# Patient Record
Sex: MALE | Race: WHITE | Employment: FULL TIME | URBAN - METROPOLITAN AREA
[De-identification: names, ages, dates, MRNs, and addresses within clinical notes are randomized per-mention and may not be internally consistent; named-entity substitution may affect disease eponyms.]

---

## 2017-04-19 ENCOUNTER — OFFICE VISIT (OUTPATIENT)
Dept: FAMILY MEDICINE CLINIC | Facility: CLINIC | Age: 32
End: 2017-04-19

## 2017-04-19 VITALS
OXYGEN SATURATION: 98 % | HEART RATE: 111 BPM | TEMPERATURE: 99 F | RESPIRATION RATE: 25 BRPM | SYSTOLIC BLOOD PRESSURE: 130 MMHG | HEIGHT: 74 IN | WEIGHT: 159 LBS | DIASTOLIC BLOOD PRESSURE: 88 MMHG | BODY MASS INDEX: 20.41 KG/M2

## 2017-04-19 DIAGNOSIS — J01.00 ACUTE NON-RECURRENT MAXILLARY SINUSITIS: Primary | ICD-10-CM

## 2017-04-19 DIAGNOSIS — J30.1 SEASONAL ALLERGIC RHINITIS DUE TO POLLEN: ICD-10-CM

## 2017-04-19 PROCEDURE — 99213 OFFICE O/P EST LOW 20 MIN: CPT | Performed by: FAMILY MEDICINE

## 2017-04-19 RX ORDER — FLUTICASONE PROPIONATE 50 MCG
1 SPRAY, SUSPENSION (ML) NASAL 2 TIMES DAILY
Qty: 1 BOTTLE | Refills: 1 | Status: SHIPPED | OUTPATIENT
Start: 2017-04-19

## 2017-04-19 RX ORDER — AZITHROMYCIN 250 MG/1
TABLET, FILM COATED ORAL
Qty: 6 TABLET | Refills: 0 | Status: SHIPPED | OUTPATIENT
Start: 2017-04-19 | End: 2018-01-08

## 2017-04-19 NOTE — PROGRESS NOTES
Alvarado Zapata is a 32year old male.    Patient presents with:  URI: tightness, burning in the chest, x1week, started w/ sore throat, dry cough     HPI:    Symptoms started   7 days ago with purulent nasal discharge, maxillary sinus pressure, and headach KAVITA-SELAM) 250 MG Oral Tab 6 tablet 0      Sig: Take two tablets by mouth today, then one tablet daily.         (J30.1) Seasonal allergic rhinitis due to pollen  Plan:     Signed Prescriptions Disp Refills    Fluticasone Propionate 50 MCG/ACT Nasal Suspension

## 2017-04-19 NOTE — PATIENT INSTRUCTIONS
Acute Sinusitis    Acute sinusitis is irritation and swelling of the sinuses. It is usually caused by a viral infection after a common cold. Your doctor can help you find relief. What is acute sinusitis?   Sinuses are air-filled spaces in the skull behin © 5048-0388 [address], [address] [place]. All rights reserved. This information is not intended as a substitute for professional medical care. Always follow your healthcare professional's instructions.

## 2018-01-08 ENCOUNTER — OFFICE VISIT (OUTPATIENT)
Dept: FAMILY MEDICINE CLINIC | Facility: CLINIC | Age: 33
End: 2018-01-08

## 2018-01-08 VITALS
WEIGHT: 169 LBS | HEIGHT: 74 IN | SYSTOLIC BLOOD PRESSURE: 128 MMHG | HEART RATE: 90 BPM | BODY MASS INDEX: 21.69 KG/M2 | DIASTOLIC BLOOD PRESSURE: 76 MMHG | TEMPERATURE: 98 F | RESPIRATION RATE: 16 BRPM

## 2018-01-08 DIAGNOSIS — J01.00 ACUTE NON-RECURRENT MAXILLARY SINUSITIS: ICD-10-CM

## 2018-01-08 DIAGNOSIS — R68.89 FLU-LIKE SYMPTOMS: Primary | ICD-10-CM

## 2018-01-08 DIAGNOSIS — J30.1 SEASONAL ALLERGIC RHINITIS DUE TO POLLEN: ICD-10-CM

## 2018-01-08 PROCEDURE — 99213 OFFICE O/P EST LOW 20 MIN: CPT | Performed by: FAMILY MEDICINE

## 2018-01-08 RX ORDER — AZITHROMYCIN 250 MG/1
TABLET, FILM COATED ORAL
Qty: 6 TABLET | Refills: 0 | Status: SHIPPED | OUTPATIENT
Start: 2018-01-08 | End: 2020-01-20 | Stop reason: ALTCHOICE

## 2018-01-08 NOTE — PROGRESS NOTES
HPI:   Primo Gilbert is a 28year old male who presents for upper respiratory symptoms for  3  days.  Patient reports sore throat, congestion, clear and yellow colored nasal discharge, dry cough, chest pain from coughing, OTC cold meds have not been helpin without murmur  GI: good BS's,no masses, HSM or tenderness    ASSESSMENT AND PLAN:   Boom Boykin is a 28year old male who presents with  1. Flu-like symptoms  -oral abx for bacterial bronchitis, recovering from flu  - azithromycin (Yana Barreto) 250

## 2018-12-02 ENCOUNTER — WALK IN (OUTPATIENT)
Dept: URGENT CARE | Age: 33
End: 2018-12-02

## 2018-12-02 VITALS
SYSTOLIC BLOOD PRESSURE: 122 MMHG | BODY MASS INDEX: 22.4 KG/M2 | WEIGHT: 165.34 LBS | HEIGHT: 72 IN | DIASTOLIC BLOOD PRESSURE: 84 MMHG | HEART RATE: 88 BPM | RESPIRATION RATE: 18 BRPM | TEMPERATURE: 98.4 F

## 2018-12-02 DIAGNOSIS — J01.10 ACUTE NON-RECURRENT FRONTAL SINUSITIS: Primary | ICD-10-CM

## 2018-12-02 PROCEDURE — 99203 OFFICE O/P NEW LOW 30 MIN: CPT | Performed by: NURSE PRACTITIONER

## 2018-12-02 RX ORDER — DOXYCYCLINE 100 MG/1
100 CAPSULE ORAL 2 TIMES DAILY
Qty: 20 CAPSULE | Refills: 0 | Status: SHIPPED | OUTPATIENT
Start: 2018-12-02 | End: 2018-12-09

## 2018-12-02 ASSESSMENT — ENCOUNTER SYMPTOMS
FEVER: 1
COUGH: 1
FACIAL SWELLING: 1
FATIGUE: 1
SORE THROAT: 1
SHORTNESS OF BREATH: 1
SINUS PRESSURE: 1

## 2019-05-19 ENCOUNTER — OFFICE VISIT (OUTPATIENT)
Dept: FAMILY MEDICINE CLINIC | Facility: CLINIC | Age: 34
End: 2019-05-19
Payer: COMMERCIAL

## 2019-05-19 VITALS
DIASTOLIC BLOOD PRESSURE: 82 MMHG | WEIGHT: 171 LBS | BODY MASS INDEX: 21.94 KG/M2 | HEART RATE: 87 BPM | SYSTOLIC BLOOD PRESSURE: 124 MMHG | HEIGHT: 74 IN | RESPIRATION RATE: 16 BRPM | TEMPERATURE: 98 F | OXYGEN SATURATION: 98 %

## 2019-05-19 DIAGNOSIS — H61.891 IRRITATION OF EXTERNAL AUDITORY CANAL, RIGHT: ICD-10-CM

## 2019-05-19 DIAGNOSIS — H61.21 IMPACTED CERUMEN OF RIGHT EAR: Primary | ICD-10-CM

## 2019-05-19 DIAGNOSIS — H60.501 ACUTE OTITIS EXTERNA OF RIGHT EAR, UNSPECIFIED TYPE: ICD-10-CM

## 2019-05-19 PROCEDURE — 99213 OFFICE O/P EST LOW 20 MIN: CPT | Performed by: PHYSICIAN ASSISTANT

## 2019-05-19 RX ORDER — OFLOXACIN 3 MG/ML
10 SOLUTION AURICULAR (OTIC) DAILY
Qty: 10 ML | Refills: 0 | Status: SHIPPED | OUTPATIENT
Start: 2019-05-19 | End: 2019-05-26

## 2019-05-19 NOTE — PROGRESS NOTES
CHIEF COMPLAINT:   Patient presents with:  Ear Problem: x 2 days, difficulty hearing, pressure, ringing in Right ear      HPI:   Kai Minaya is a 35year old male who presents for clogged right ear for  2 days.   Patient reports decreased hearing to righ EARS: Ears visualized after right cerumen removal.  Left TM not erythematous, no bulging, no retraction,  No fluid, bony landmarks intact.  Left EAC WNL.  Right TM minimally erythematous, no bulging, no retraction, no fluid.  Right EAC erythematous.      N Risks, benefits, and side effects of medication explained and discussed. Patient Instructions     -DEBROX DROPS AS NEEDED FOR WAX      Impacted Earwax     Inner ear structures including ear canal and eardrum.      Impacted earwax is a buildup of the natu In some cases, the cause of impacted earwax is not known. Symptoms of impacted earwax  Excess earwax usually does not cause any symptoms, unless there is a large amount of buildup.  Then it may cause symptoms such as:  · Hearing loss  · Earache  · Sense of © 9753-8589 [address] [facility], [address] [place]. All rights reserved. This information is not intended as a substitute for professional medical care. Always follow your healthcare professional's instructions.             The

## 2019-05-19 NOTE — PATIENT INSTRUCTIONS
-DEBROX DROPS AS NEEDED FOR WAX      Impacted Earwax     Inner ear structures including ear canal and eardrum. Impacted earwax is a buildup of the natural wax in the ear (cerumen). Impacted earwax is very common.  It can cause symptoms such as hearing l there is a large amount of buildup.  Then it may cause symptoms such as:  · Hearing loss  · Earache  · Sense of ear fullness  · Itching in the ear  · Odor from the ear  · Ear drainage  · Dizziness  · Ringing in the ears  · Cough  Treatment for impacted earw professional medical care. Always follow your healthcare professional's instructions.

## 2020-01-20 ENCOUNTER — OFFICE VISIT (OUTPATIENT)
Dept: FAMILY MEDICINE CLINIC | Facility: CLINIC | Age: 35
End: 2020-01-20
Payer: COMMERCIAL

## 2020-01-20 VITALS
OXYGEN SATURATION: 97 % | HEART RATE: 88 BPM | RESPIRATION RATE: 16 BRPM | DIASTOLIC BLOOD PRESSURE: 84 MMHG | WEIGHT: 172.19 LBS | TEMPERATURE: 98 F | HEIGHT: 74 IN | BODY MASS INDEX: 22.1 KG/M2 | SYSTOLIC BLOOD PRESSURE: 122 MMHG

## 2020-01-20 DIAGNOSIS — J06.9 VIRAL URI WITH COUGH: Primary | ICD-10-CM

## 2020-01-20 PROCEDURE — 99213 OFFICE O/P EST LOW 20 MIN: CPT | Performed by: NURSE PRACTITIONER

## 2020-01-20 RX ORDER — AZITHROMYCIN 250 MG/1
TABLET, FILM COATED ORAL
Qty: 6 TABLET | Refills: 0 | Status: SHIPPED | OUTPATIENT
Start: 2020-01-20 | End: 2021-05-22

## 2020-01-20 RX ORDER — FLUTICASONE PROPIONATE 50 MCG
2 SPRAY, SUSPENSION (ML) NASAL DAILY
Qty: 1 BOTTLE | Refills: 0 | Status: SHIPPED | OUTPATIENT
Start: 2020-01-20 | End: 2021-01-14

## 2020-01-20 NOTE — PROGRESS NOTES
CHIEF COMPLAINT:   Patient presents with:  Nasal Congestion: cough, stuffy head x 3 days       HPI:   Blair Coleman is a 29year old male who presents for upper respiratory symptoms intermittently for about 1 month.  This occurrence feels to be new onset GI: denies N/V/C or abdominal pain  NEURO: Denies headaches    EXAM:   /84 (BP Location: Right arm, Patient Position: Sitting, Cuff Size: adult)   Pulse 88   Temp 98.4 °F (36.9 °C) (Oral)   Resp 16   Ht 74\"   Wt 172 lb 3.2 oz (78.1 kg)   SpO2 97% The sinuses are air-filled spaces within the bones of the face. They connect to the inside of the nose. Sinusitis is an inflammation of the tissue that lines the sinuses.  Sinusitis can occur during a cold. It can also happen due to allergies to pollens and · Use acetaminophen or ibuprofen to control pain, unless another pain medicine was prescribed to you. If you have chronic liver or kidney disease or ever had a stomach ulcer, talk with your healthcare provider before using these medicines.  (Aspirin should

## 2021-05-22 ENCOUNTER — OFFICE VISIT (OUTPATIENT)
Dept: FAMILY MEDICINE CLINIC | Facility: CLINIC | Age: 36
End: 2021-05-22
Payer: COMMERCIAL

## 2021-05-22 VITALS
HEIGHT: 74 IN | BODY MASS INDEX: 22.46 KG/M2 | WEIGHT: 175 LBS | OXYGEN SATURATION: 98 % | RESPIRATION RATE: 18 BRPM | DIASTOLIC BLOOD PRESSURE: 86 MMHG | HEART RATE: 87 BPM | SYSTOLIC BLOOD PRESSURE: 127 MMHG | TEMPERATURE: 97 F

## 2021-05-22 DIAGNOSIS — H61.21 IMPACTED CERUMEN OF RIGHT EAR: ICD-10-CM

## 2021-05-22 DIAGNOSIS — H60.501 ACUTE OTITIS EXTERNA OF RIGHT EAR, UNSPECIFIED TYPE: Primary | ICD-10-CM

## 2021-05-22 PROCEDURE — 3008F BODY MASS INDEX DOCD: CPT | Performed by: NURSE PRACTITIONER

## 2021-05-22 PROCEDURE — 99213 OFFICE O/P EST LOW 20 MIN: CPT | Performed by: NURSE PRACTITIONER

## 2021-05-22 PROCEDURE — 3074F SYST BP LT 130 MM HG: CPT | Performed by: NURSE PRACTITIONER

## 2021-05-22 PROCEDURE — 3079F DIAST BP 80-89 MM HG: CPT | Performed by: NURSE PRACTITIONER

## 2021-05-22 NOTE — PATIENT INSTRUCTIONS
External Ear Infection (Adult)    External otitis (also called “swimmer’s ear”) is an infection in the ear canal. It's often caused by bacteria or fungus. It can occur a few days after water gets trapped in the ear canal (from swimming or bathing).  It ca to seek medical advice  Call your healthcare provider right away if any of these occur:   · Ear pain becomes worse or doesn’t improve after 3 days of treatment  · Redness or swelling of the outer ear occurs or gets worse  · Headache  · Fever of 100.4ºF (38 such as eczema  · Autoimmune diseases, such as lupus  · A narrowed ear canal from birth, chronic inflammation, or injury  · Too much earwax because of injury  · Too much earwax because of  water in the ear canal  Putting objects in the ear again and again cases, you may be able to prevent earwax buildup by:   · Using ear drops once a week  · Having a regular ear cleaning about every 6 months  · Not using cotton swabs in the ear  When to call the healthcare provider  Call your healthcare provider right away

## 2021-05-22 NOTE — PROGRESS NOTES
CHIEF COMPLAINT:   Patient presents with:  Ear Problem      HPI:   Jamia Everett is a 28year old male who presents to clinic today with complaints of right ear clogged. Has had for 5  days. Patient denies history of ear infections.   Associated sympto with white discharge  Right TM: serous, + bulging, no retraction, no effusion, bony landmarks intact. Left TM: serous, + bulging, no retraction,no effusion, bony landmarks intact.   NOSE: nostrils patent, no nasal discharge, nasal mucosa pink and noninfla 0     Sig: Place 4 drops into the right ear 4 (four) times daily for 7 days.            Patient Instructions       External Ear Infection (Adult)    External otitis (also called “swimmer’s ear”) is an infection in the ear canal. It's often caused by bacteri from the ear canal.    Follow-up care  Follow up with your healthcare provider in 1 week, or as advised.    When to seek medical advice  Call your healthcare provider right away if any of these occur:   · Ear pain becomes worse or doesn’t improve after 3 da the ear (osteoma or exostoses)  · Infections, such as an outer ear infection (external otitis)  · Skin disease, such as eczema  · Autoimmune diseases, such as lupus  · A narrowed ear canal from birth, chronic inflammation, or injury  · Too much earwax meggan be able to prevent impacted earwax if you have a health condition that causes it, such as eczema.  In other cases, you may be able to prevent earwax buildup by:   · Using ear drops once a week  · Having a regular ear cleaning about every 6 months  · Not usi

## 2022-03-23 ENCOUNTER — OFFICE VISIT (OUTPATIENT)
Dept: FAMILY MEDICINE CLINIC | Facility: CLINIC | Age: 37
End: 2022-03-23
Payer: COMMERCIAL

## 2022-03-23 VITALS
DIASTOLIC BLOOD PRESSURE: 80 MMHG | WEIGHT: 175 LBS | SYSTOLIC BLOOD PRESSURE: 130 MMHG | OXYGEN SATURATION: 98 % | TEMPERATURE: 100 F | BODY MASS INDEX: 23.7 KG/M2 | HEART RATE: 98 BPM | HEIGHT: 72 IN | RESPIRATION RATE: 20 BRPM

## 2022-03-23 DIAGNOSIS — J02.9 SORE THROAT: Primary | ICD-10-CM

## 2022-03-23 LAB
CONTROL LINE PRESENT WITH A CLEAR BACKGROUND (YES/NO): YES YES/NO
OPERATOR ID: NORMAL
POCT LOT NUMBER: NORMAL
RAPID SARS-COV-2 BY PCR: NOT DETECTED
STREP GRP A CUL-SCR: NEGATIVE

## 2022-03-23 PROCEDURE — 99213 OFFICE O/P EST LOW 20 MIN: CPT | Performed by: NURSE PRACTITIONER

## 2022-03-23 PROCEDURE — 3079F DIAST BP 80-89 MM HG: CPT | Performed by: NURSE PRACTITIONER

## 2022-03-23 PROCEDURE — 3008F BODY MASS INDEX DOCD: CPT | Performed by: NURSE PRACTITIONER

## 2022-03-23 PROCEDURE — 3075F SYST BP GE 130 - 139MM HG: CPT | Performed by: NURSE PRACTITIONER

## 2022-03-23 PROCEDURE — 87880 STREP A ASSAY W/OPTIC: CPT | Performed by: NURSE PRACTITIONER

## 2022-03-23 PROCEDURE — U0002 COVID-19 LAB TEST NON-CDC: HCPCS | Performed by: NURSE PRACTITIONER

## 2024-11-11 ENCOUNTER — OFFICE VISIT (OUTPATIENT)
Dept: FAMILY MEDICINE CLINIC | Facility: CLINIC | Age: 39
End: 2024-11-11
Payer: COMMERCIAL

## 2024-11-11 VITALS
OXYGEN SATURATION: 100 % | BODY MASS INDEX: 24 KG/M2 | WEIGHT: 178 LBS | DIASTOLIC BLOOD PRESSURE: 80 MMHG | HEART RATE: 74 BPM | TEMPERATURE: 99 F | SYSTOLIC BLOOD PRESSURE: 122 MMHG

## 2024-11-11 DIAGNOSIS — H61.23 BILATERAL IMPACTED CERUMEN: Primary | ICD-10-CM

## 2024-11-11 PROCEDURE — 3074F SYST BP LT 130 MM HG: CPT | Performed by: NURSE PRACTITIONER

## 2024-11-11 PROCEDURE — 69209 REMOVE IMPACTED EAR WAX UNI: CPT | Performed by: NURSE PRACTITIONER

## 2024-11-11 PROCEDURE — 3079F DIAST BP 80-89 MM HG: CPT | Performed by: NURSE PRACTITIONER

## 2024-11-11 NOTE — PROGRESS NOTES
CHIEF COMPLAINT:     Chief Complaint   Patient presents with    Ear Pain     Right ear pain with clogged for 3 days.  No OTC meds taken.         HPI:   Yousuf Griffin is a 39 year old male who presents to clinic today with complaints of bilat ear clogged R>L. Has had for 3  days.   Patient reports history of cerumen impaction. Home treatment includes none.     Associated symptoms:  Patient denies decreased hearing. Patient denies hearing loss. Patient denies drainage. Patient reports use of cotton tipped ear swabs to clean the ears. Patient reports following URI symptoms: none    Current Outpatient Medications   Medication Sig Dispense Refill    Fluticasone Propionate 50 MCG/ACT Nasal Suspension 1 spray by Nasal route 2 (two) times daily. (Patient not taking: No sig reported) 1 Bottle 1    Cetirizine HCl 10 MG Oral Cap Take 1 tablet by mouth daily.        Past Medical History:    Febrile seizure (HCC)    as a child    Seizure (HCC)      Social History:  Social History     Socioeconomic History    Marital status:    Occupational History     Employer: [employer]   Tobacco Use    Smoking status: Never    Smokeless tobacco: Never   Vaping Use    Vaping status: Never Used   Substance and Sexual Activity    Alcohol use: No    Drug use: No   Other Topics Concern    Caffeine Concern Yes     Comment: 2 cups daily    Sleep Concern No    Exercise Yes     Comment: walking        REVIEW OF SYSTEMS:   GENERAL: See HPI  SKIN: no unusual skin lesions or rashes  HEENT: See HPI  LUNGS: No shortness of breath, or wheezing.  CARDIOVASCULAR: No chest pain, palpitations  GI: No N/V/C/D.  NEURO: denies headaches, + mild dizziness for 2 days    EXAM:   /80   Pulse 74   Temp 98.6 °F (37 °C) (Oral)   Wt 178 lb (80.7 kg)   SpO2 100%   BMI 24.14 kg/m²   GENERAL: well developed, well nourished,in no apparent distress  HEAD: atraumatic, normocephalic  EYES: conjunctiva clear,   EARS: bilat tragus non tender with manipulation.   External auditory canal right mild erythema after cerumen removal, left with minimal cerumen. Right TM: grey, no bulging, no retraction, no effusion, bony landmarks visible.  Left TM: grey, no bulging, no retraction,no effusion, bony landmarks visible.  NOSE: nostrils patent, no nasal mucus,   LUNGS: clear to auscultation bilaterally, no wheezes or rhonchi. Breathing is non labored.  CARDIO: RRR without murmur  PSYCH: pleasant mood and affect  NEURO: no focal deficits      ASSESSMENT AND PLAN:   Yousuf Griffin is a 39 year old male who presents with ear problems symptoms are consistent with    ASSESSMENT:  Encounter Diagnosis   Name Primary?    Bilateral impacted cerumen Yes       PLAN:   After pt gave verbal consent, bilat ears irrigated with warm water/H2O2 without incident with successful removal of cerumen  Comfort measures as described in Patient Instructions     Follow up with PCP PRN  Patient voiced understand and is in agreement with treatment plan.    There are no Patient Instructions on file for this visit.

## 2024-11-14 ENCOUNTER — TELEPHONE (OUTPATIENT)
Dept: FAMILY MEDICINE CLINIC | Facility: CLINIC | Age: 39
End: 2024-11-14

## 2024-11-14 ENCOUNTER — PATIENT MESSAGE (OUTPATIENT)
Dept: FAMILY MEDICINE CLINIC | Facility: CLINIC | Age: 39
End: 2024-11-14

## 2024-11-15 ENCOUNTER — TELEPHONE (OUTPATIENT)
Dept: FAMILY MEDICINE CLINIC | Facility: CLINIC | Age: 39
End: 2024-11-15

## 2024-11-16 ENCOUNTER — OFFICE VISIT (OUTPATIENT)
Dept: FAMILY MEDICINE CLINIC | Facility: CLINIC | Age: 39
End: 2024-11-16
Payer: COMMERCIAL

## 2024-11-16 VITALS
HEART RATE: 96 BPM | SYSTOLIC BLOOD PRESSURE: 128 MMHG | DIASTOLIC BLOOD PRESSURE: 88 MMHG | OXYGEN SATURATION: 98 % | TEMPERATURE: 98 F | RESPIRATION RATE: 16 BRPM

## 2024-11-16 DIAGNOSIS — H61.22 IMPACTED CERUMEN OF LEFT EAR: Primary | ICD-10-CM

## 2024-11-16 PROCEDURE — 3079F DIAST BP 80-89 MM HG: CPT | Performed by: NURSE PRACTITIONER

## 2024-11-16 PROCEDURE — 69209 REMOVE IMPACTED EAR WAX UNI: CPT | Performed by: NURSE PRACTITIONER

## 2024-11-16 PROCEDURE — 3074F SYST BP LT 130 MM HG: CPT | Performed by: NURSE PRACTITIONER

## 2024-11-16 NOTE — PROGRESS NOTES
Patient presents with:    Request for Ear Wax Removal    HPI: Patient presents for removal of ear wax in left. Has ongoing issue with cerumen build up. Home treatments tried: debrox ear drops. Reports diminished hearing, itching.  Denies ear pain, fever, chills.    Cerumen Removal Procedure  Patient gave verbal consent.   Risks and Benefits of removal were discussed with the patient. Pt agreed to proceed with procedure.    Location: Left ear  Indication: TM not visible, local itching, fullness.  Prep: Hydrogen Peroxide with warm water via syringe  Removal: Otoscope visualization of cerumen and ear lavage were used to remove the wax  Patient Status: Tolerated well; No complications      EXAM: Prior to cerumen removal:  left EAC with cerumen impaction; left TM not visible.  No tragal tenderness on palpation.  After removal: EACs healthy and without lesions. TMs healthy, no injection, fluid, retraction.     Assessment:  Cerumen impaction of left ear    Plan: Successful removal with ear lavage. Patient tolerated without complications.    Discussed prevention of impaction.     F/U as needed.

## 2024-11-26 ENCOUNTER — OFFICE VISIT (OUTPATIENT)
Facility: LOCATION | Age: 39
End: 2024-11-26

## 2024-11-26 DIAGNOSIS — H93.8X9 SENSATION OF FULLNESS IN EAR, UNSPECIFIED LATERALITY: Primary | ICD-10-CM

## 2024-11-26 DIAGNOSIS — H93.8X2 SENSATION OF FULLNESS IN LEFT EAR: Primary | ICD-10-CM

## 2024-11-26 DIAGNOSIS — H92.02 LEFT EAR PAIN: ICD-10-CM

## 2024-11-26 PROCEDURE — 92557 COMPREHENSIVE HEARING TEST: CPT | Performed by: AUDIOLOGIST

## 2024-11-26 PROCEDURE — 92567 TYMPANOMETRY: CPT | Performed by: AUDIOLOGIST

## 2024-11-26 PROCEDURE — 99203 OFFICE O/P NEW LOW 30 MIN: CPT | Performed by: STUDENT IN AN ORGANIZED HEALTH CARE EDUCATION/TRAINING PROGRAM

## 2024-11-26 PROCEDURE — 92504 EAR MICROSCOPY EXAMINATION: CPT | Performed by: STUDENT IN AN ORGANIZED HEALTH CARE EDUCATION/TRAINING PROGRAM

## 2024-11-26 NOTE — PROGRESS NOTES
[facility]  OTOLARYNGOLOGY - HEAD & NECK SURGERY    11/26/2024     Reason for Consultation:   Left ear fullness, left ear pain    History of Present Illness:   Patient is a pleasant 39 year old male who is being seen for left-sided ear fullness and pain which began earlier in the month.  The patient states that he initially came in to see the urgent care for his right ear fullness.  At that time he had his right ear irrigated and debris was cleared.  He felt instant relief on the right side.  At the same time they noted fullness in the left ear canal and irrigated his left ear without any success.  They told the patient that the rest of it might dissolve however he continues to be symptomatic and return to see them.  They then irrigated again and they were able to clear the ear however he continues to feel fullness in the left ear.  He has not had any recurring ear infections in the past.  No history of any ear surgery.  No vertigo aside from when he had his ears irrigated.    Past Medical History  Past Medical History:    Febrile seizure (HCC)    as a child    Seizure (HCC)       Past Surgical History  Past Surgical History:   Procedure Laterality Date    Adenoidectomy      Removal adenoids,primary,<13 y/o      Tonsillectomy         Family History  Family History   Problem Relation Age of Onset    Dementia Other     Lipids Other        Social History  Pediatric History   Patient Parents    Placido Griffin (Father)     Other Topics Concern     Service Not Asked    Blood Transfusions Not Asked    Caffeine Concern Yes     Comment: 2 cups daily    Occupational Exposure Not Asked    Hobby Hazards Not Asked    Sleep Concern No    Stress Concern Not Asked    Weight Concern Not Asked    Special Diet Not Asked    Back Care Not Asked    Exercise Yes     Comment: walking    Bike Helmet Not Asked    Seat Belt Not Asked    Self-Exams Not Asked   Social History Narrative    Not on file           Current Medications:  Current  Outpatient Medications   Medication Sig Dispense Refill    Fluticasone Propionate 50 MCG/ACT Nasal Suspension 1 spray by Nasal route 2 (two) times daily. (Patient not taking: No sig reported) 1 Bottle 1    Cetirizine HCl 10 MG Oral Cap Take 1 tablet by mouth daily.         Allergies  Allergies[1]    Review of Systems:   A comprehensive 10 point review of systems was completed.  Pertinent positives and negatives noted in the the HPI.    Physical Exam:   There were no vitals taken for this visit.    GENERAL: No acute distress, Comfortable appearing  FACE: HB 1/6, Normal Animation  HEAD: Normocephalic  EYES: EOMI, pupils equil  EARS: Bilateral Auricles Symmetric  NOSE: Nares patent bilaterally  ORAL CAVITY: Tongue mobile, Oropharynx clear, Floor of mouth clear, Posterior oropharynx normal  NECK: No palpable lymphadenopathy, thyroid not palpable, nontender    Canals:  Right: Clear  Left: Clear    Tympanic Membranes:  Right: Normal tympanic membrane, with no retraction, middle ear space clear  Left: Normal tympanic membrane. with no retraction middle ear space clear    TM Visualized Method:   Right TM examined via otomicroscopy.   Left TM examined via otomicroscopy.      Results:     Laboratory Data:  Lab Results   Component Value Date    WBC 8.1 05/28/2016    HGB 16.6 05/28/2016    HCT 49.6 05/28/2016    .0 05/28/2016    CREATSERUM 0.94 05/28/2016    BUN 21 (H) 05/28/2016     05/28/2016    K 4.0 05/28/2016     05/28/2016    CO2 28.0 05/28/2016    GLU 95 05/28/2016    CA 9.2 05/28/2016    ALB 4.4 05/28/2016    ALKPHO 68 05/28/2016    TP 8.0 05/28/2016    AST 30 05/28/2016    ALT 28 05/28/2016    TSH 1.690 05/28/2016    POCGLU 155 (H) 05/27/2013         Imaging:  No results found.    Latest Audiogram Result (Hz) Exam performed: 11/26/2024 3:30 PM Last edited by Ani Castañeda Au.D on 11/26/2024 3:35 PM        125 250  1500 2000 3000 4000 6000 8000    Right air:  15 15  10  5  15  10    Left  air:  10 10  10  5  20  10    Left mastoid bone:         15         Reliability:  Fair    Transducer:  Inserts    Technique:  Conventional Audiometry    Comments:            Latest Speech Audiometry  Last edited by Ani Castañeda Au.D on 11/26/2024 3:34 PM       Ear Method PTA SAT SRT [facility] Test/list Score (%) Intensity Mask/noise Notes    right live voice   15   10 By Difficulty 100 55      left live voice   15   10 By Difficulty 96 55                    Latest Tympanogram Result       Probe Tone (Hz): 226 Exam performed: 11/26/2024 3:30 PM Last edited by Ani Castañeda Au.D on 11/26/2024 3:35 PM      Tympanograms  These were drawn by a user, not generated from device data      Right Ear Left Ear                     Right Ear Left Ear    Tympanogram type: Type A Type A    Canal volume (mL): 2.7 1.9    Peak pressure (daPa): 0 6    Peak amplitude (mL): 0.88 0.76    Tympanogram width (daPa):        Comments:                    Latest Audiogram and Tympanogram Result Text  Last edited by Ani Castañeda Au.D on 11/26/2024  3:34 PM      Study Result                 Narrative & Impression    Otoscopic inspection: right ear no cerumen; left ear no cerumen.       Tests/Procedures  Patient was tested via  standard insert earphones and a bone oscillator standard audiometry     Audiometric Results (Pure Tone Results)    Audiometric thresholds indicate hearing is within normal limits in both ears     Immitance Test Results    A tympanogram was performed  Tympanometry suggests normal findings bilaterally.    Follow up with Sherif Hankins D.O..  Audiological monitoring as needed during the course of medical management.                       Impression:       ICD-10-CM    1. Sensation of fullness in left ear  H93.8X2       2. Left ear pain  H92.02            Recommendations:  The patient's audiogram today is completely normal with type a tympanograms bilaterally.  The patient states that his symptoms in the left ear do  fluctuate.  He does sometimes have some popping and crackling in the ear.  I do think that there may be a component of eustachian tube dysfunction.  I would like him to use Flonase on a more regular basis with 2 sprays in each nostril twice daily.  He will continue this and he will return to see me if he has any persistence in his symptoms or any worsening.    Thank you for allowing me to participate in the care of your patient.    Sherif Hankins,    Otolaryngology/Rhinology, Sinus, and Endoscopic Skull Base Surgery  [address] [place] [address]  Phone [phone]  Fax [phone]  11/26/2024  3:26 PM  11/26/2024          [1]   Allergies  Allergen Reactions    Penicillins HIVES

## 2025-02-27 ENCOUNTER — OFFICE VISIT (OUTPATIENT)
Dept: FAMILY MEDICINE CLINIC | Facility: CLINIC | Age: 40
End: 2025-02-27
Payer: COMMERCIAL

## 2025-02-27 VITALS
HEART RATE: 117 BPM | RESPIRATION RATE: 16 BRPM | HEIGHT: 72 IN | OXYGEN SATURATION: 97 % | SYSTOLIC BLOOD PRESSURE: 122 MMHG | TEMPERATURE: 101 F | BODY MASS INDEX: 24.52 KG/M2 | DIASTOLIC BLOOD PRESSURE: 74 MMHG | WEIGHT: 181 LBS

## 2025-02-27 DIAGNOSIS — R68.89 FLU-LIKE SYMPTOMS: Primary | ICD-10-CM

## 2025-02-27 PROCEDURE — 99213 OFFICE O/P EST LOW 20 MIN: CPT | Performed by: NURSE PRACTITIONER

## 2025-02-27 PROCEDURE — 3008F BODY MASS INDEX DOCD: CPT | Performed by: NURSE PRACTITIONER

## 2025-02-27 PROCEDURE — 3078F DIAST BP <80 MM HG: CPT | Performed by: NURSE PRACTITIONER

## 2025-02-27 PROCEDURE — 87637 SARSCOV2&INF A&B&RSV AMP PRB: CPT | Performed by: NURSE PRACTITIONER

## 2025-02-27 PROCEDURE — 3074F SYST BP LT 130 MM HG: CPT | Performed by: NURSE PRACTITIONER

## 2025-02-27 RX ORDER — OSELTAMIVIR PHOSPHATE 75 MG/1
75 CAPSULE ORAL 2 TIMES DAILY
Qty: 10 CAPSULE | Refills: 0 | Status: SHIPPED | OUTPATIENT
Start: 2025-02-27 | End: 2025-03-04

## 2025-02-28 LAB
FLUAV + FLUBV RNA SPEC NAA+PROBE: DETECTED
FLUAV + FLUBV RNA SPEC NAA+PROBE: NOT DETECTED
RSV RNA SPEC NAA+PROBE: NOT DETECTED
SARS-COV-2 RNA RESP QL NAA+PROBE: NOT DETECTED

## 2025-02-28 NOTE — PROGRESS NOTES
CHIEF COMPLAINT:     Chief Complaint   Patient presents with    Cough     Sx onset last night w chills, cough, congestion, HA, body aches  Denies ST     OTC Tylenol        HPI:   Yousuf Griffin is a 39 year old male who presents for ill symptoms for 1 days. Patient reports chills, cough, congestion, headache, body aches, fatigue. Denies ear pain. Symptoms have been worsening since onset.  Treating symptoms with tylenol.      Current Outpatient Medications   Medication Sig Dispense Refill    oseltamivir 75 MG Oral Cap Take 1 capsule (75 mg total) by mouth 2 (two) times daily for 5 days. 10 capsule 0    Fluticasone Propionate 50 MCG/ACT Nasal Suspension 1 spray by Nasal route 2 (two) times daily. (Patient not taking: No sig reported) 1 Bottle 1    Cetirizine HCl 10 MG Oral Cap Take 1 tablet by mouth daily.        Past Medical History:    Febrile seizure (HCC)    as a child    Seizure (HCC)      Past Surgical History:   Procedure Laterality Date    Adenoidectomy      Removal adenoids,primary,<13 y/o      Tonsillectomy           Social History     Socioeconomic History    Marital status:    Occupational History     Employer: [employer]   Tobacco Use    Smoking status: Never    Smokeless tobacco: Never   Vaping Use    Vaping status: Never Used   Substance and Sexual Activity    Alcohol use: No    Drug use: No   Other Topics Concern    Caffeine Concern Yes     Comment: 2 cups daily    Sleep Concern No    Exercise Yes     Comment: walking         REVIEW OF SYSTEMS:   GENERAL: feels well otherwise, good appetite  SKIN: no rashes or abnormal skin lesions  HEENT: See HPI  LUNGS: denies shortness of breath or wheezing, See HPI  CARDIOVASCULAR: denies chest pain or palpitations   GI: denies N/V/C or abdominal pain  NEURO: Denies headaches    EXAM:   /74   Pulse 117   Temp (!) 101.1 °F (38.4 °C) (Oral)   Resp 16   Ht 6' (1.829 m)   Wt 181 lb (82.1 kg)   SpO2 97%   BMI 24.55 kg/m²   GENERAL: well developed, well  nourished, in no apparent distress  SKIN: no rashes, no suspicious lesions  HEENT: atraumatic, normocephalic. conjunctiva clear. TM's gray, no bulging, no retraction, + fluid, bony landmarks intact. clear nasal discharge, nasal mucosa erythematous and swollen. Oral mucosa pink, moist. Posterior pharynx is not erythematous. no exudates. no Sinus tenderness with palpation.   THROAT:NECK: Supple, non-tender  LUNGS: clear to auscultation   CARDIO: RRR without murmur  EXTREMITIES: no cyanosis, clubbing or edema  LYMP: bilateral anterior cervical lymphadenopathy.    ASSESSMENT AND PLAN:   Yousuf Griffin is a 39 year old male who presents with     Yousuf was seen today for cough.    Diagnoses and all orders for this visit:    Flu-like symptoms  -     SARS-CoV-2/Flu A and B/RSV by PCR ([facility]); Future  -     SARS-CoV-2/Flu A and B/RSV by PCR ([facility])  -     oseltamivir 75 MG Oral Cap; Take 1 capsule (75 mg total) by mouth 2 (two) times daily for 5 days.       Risks, benefits, and side effects of medication explained and discussed.    Discussed physical exam and hpi with pt. No bacterial focus noted on exam. Pt has reassuring physical exam consistent with viral uri. Lungs clear bilat. No respiratory distress noted. Treatment options discussed with patient and explained in detail. We reviewed symptomatic care at home. The risks, benefits and potential side effects of possible medications were reviewed. Alternatives were discussed. Monitoring parameters and expected course outlined. Patient to call PCP or go to emergency department if symptoms fail to respond as outlined, or worsen in any way. The patient agreed with the plan.  See Patient Handout    The patient indicates understanding of these issues and agrees to the plan.  The patient is asked to follow up with PCP if sx's persist or worsen.

## 2025-08-13 ENCOUNTER — OFFICE VISIT (OUTPATIENT)
Facility: LOCATION | Age: 40
End: 2025-08-13

## 2025-08-13 VITALS — BODY MASS INDEX: 24 KG/M2 | WEIGHT: 180 LBS

## 2025-08-13 DIAGNOSIS — H61.23 BILATERAL IMPACTED CERUMEN: ICD-10-CM

## 2025-08-13 DIAGNOSIS — H69.93 DYSFUNCTION OF BOTH EUSTACHIAN TUBES: ICD-10-CM

## 2025-08-13 DIAGNOSIS — H93.8X2 SENSATION OF FULLNESS IN LEFT EAR: Primary | ICD-10-CM

## 2025-08-13 PROCEDURE — 69210 REMOVE IMPACTED EAR WAX UNI: CPT | Performed by: STUDENT IN AN ORGANIZED HEALTH CARE EDUCATION/TRAINING PROGRAM

## 2025-08-13 PROCEDURE — 99213 OFFICE O/P EST LOW 20 MIN: CPT | Performed by: STUDENT IN AN ORGANIZED HEALTH CARE EDUCATION/TRAINING PROGRAM

## 2025-08-13 RX ORDER — AZELASTINE 1 MG/ML
2 SPRAY, METERED NASAL DAILY
Qty: 30 ML | Refills: 3 | Status: SHIPPED | OUTPATIENT
Start: 2025-08-13

## (undated) NOTE — LETTER
Date: 1/8/2018    Patient Name: Talia Raymond          To Whom it may concern: The above patient was seen at the [facility] for treatment of a medical condition. This patient should be excused from attending work 1/8/18 through 1/9/18.

## (undated) NOTE — MR AVS SNAPSHOT
[address]  [address] [address]  [phone]               Thank you for choosing us for your health care visit with Peter Garibay MD.  We are glad to serve you and happy to provide you with this summary of will look at your ear, nose, and throat. You usually won't need to have X-rays taken.    The doctor may take a sample of mucus to check for bacteria. If you have sinusitis that keeps coming back, you may need imaging tests such as X-rays or CAT scans.  This [address] [facility], [address]      Phone:  [phone]    - azithromycin 250 MG Tabs  - Fluticasone Propionate 50 MCG/ACT Susp            MyChart                  Visit [facility] online at  [email or web address]

## (undated) NOTE — LETTER
Date: 2/27/2025    Patient Name: Yousuf Griffin          To Whom it may concern:    This letter has been written at the patient's request. The above patient was seen at [facility] for treatment of a medical condition.    This patient should be excused from attending work from days missed.     The patient may return to work when fever free for 24hrs with improved symptoms with the following limitations none.        Sincerely,    Olszewski,Kristen J, APRN

## (undated) NOTE — LETTER
Date: 3/23/2022    Patient Name: Caryl [facility]          To Whom it may concern: This letter has been written at the patient's request. The above patient was seen at the [facility] for treatment of a medical condition. This patient should be excused from attending work through 3/25/22. The patient may return to work on 3/26/22 with no limitations.         Sincerely,    SIN Jain